# Patient Record
Sex: FEMALE | Race: WHITE | Employment: UNEMPLOYED | URBAN - NONMETROPOLITAN AREA
[De-identification: names, ages, dates, MRNs, and addresses within clinical notes are randomized per-mention and may not be internally consistent; named-entity substitution may affect disease eponyms.]

---

## 2018-01-11 ENCOUNTER — HOSPITAL ENCOUNTER (OUTPATIENT)
Age: 63
Setting detail: OBSERVATION
Discharge: HOME | End: 2018-01-13
Attending: INTERNAL MEDICINE | Admitting: INTERNAL MEDICINE
Payer: MEDICAID

## 2018-01-11 PROBLEM — R79.89 ELEVATED D-DIMER: Status: ACTIVE | Noted: 2018-01-11

## 2018-01-11 PROCEDURE — 2140000000 HC CCU INTERMEDIATE R&B

## 2018-01-11 PROCEDURE — G0378 HOSPITAL OBSERVATION PER HR: HCPCS

## 2018-01-11 PROCEDURE — G0379 DIRECT REFER HOSPITAL OBSERV: HCPCS

## 2018-01-11 PROCEDURE — 6360000002 HC RX W HCPCS: Performed by: INTERNAL MEDICINE

## 2018-01-11 PROCEDURE — 96374 THER/PROPH/DIAG INJ IV PUSH: CPT

## 2018-01-11 RX ORDER — PROMETHAZINE HYDROCHLORIDE 25 MG/1
25 TABLET ORAL EVERY 6 HOURS PRN
Status: DISCONTINUED | OUTPATIENT
Start: 2018-01-11 | End: 2018-01-13 | Stop reason: HOSPADM

## 2018-01-11 RX ORDER — NITROGLYCERIN 0.4 MG/1
0.4 TABLET SUBLINGUAL EVERY 5 MIN PRN
COMMUNITY

## 2018-01-11 RX ORDER — ATENOLOL 50 MG/1
50 TABLET ORAL 2 TIMES DAILY
Status: DISCONTINUED | OUTPATIENT
Start: 2018-01-12 | End: 2018-01-13 | Stop reason: HOSPADM

## 2018-01-11 RX ORDER — ALBUTEROL SULFATE 90 UG/1
2 AEROSOL, METERED RESPIRATORY (INHALATION) EVERY 6 HOURS PRN
Status: DISCONTINUED | OUTPATIENT
Start: 2018-01-11 | End: 2018-01-13 | Stop reason: HOSPADM

## 2018-01-11 RX ORDER — ALBUTEROL SULFATE 90 UG/1
2 AEROSOL, METERED RESPIRATORY (INHALATION) EVERY 6 HOURS PRN
COMMUNITY

## 2018-01-11 RX ORDER — MEPERIDINE HYDROCHLORIDE 25 MG/ML
50 INJECTION INTRAMUSCULAR; INTRAVENOUS; SUBCUTANEOUS
Status: DISCONTINUED | OUTPATIENT
Start: 2018-01-11 | End: 2018-01-12

## 2018-01-11 RX ORDER — BUDESONIDE AND FORMOTEROL FUMARATE DIHYDRATE 160; 4.5 UG/1; UG/1
2 AEROSOL RESPIRATORY (INHALATION) 2 TIMES DAILY
COMMUNITY

## 2018-01-11 RX ORDER — ASPIRIN 81 MG/1
81 TABLET, CHEWABLE ORAL DAILY
Status: DISCONTINUED | OUTPATIENT
Start: 2018-01-12 | End: 2018-01-13 | Stop reason: HOSPADM

## 2018-01-11 RX ORDER — PANTOPRAZOLE SODIUM 40 MG/1
40 TABLET, DELAYED RELEASE ORAL DAILY
Status: DISCONTINUED | OUTPATIENT
Start: 2018-01-12 | End: 2018-01-13

## 2018-01-11 RX ORDER — MEPERIDINE HYDROCHLORIDE 25 MG/ML
25 INJECTION INTRAMUSCULAR; INTRAVENOUS; SUBCUTANEOUS
Status: DISCONTINUED | OUTPATIENT
Start: 2018-01-11 | End: 2018-01-12

## 2018-01-11 RX ORDER — BUTALBITAL, ACETAMINOPHEN AND CAFFEINE 50; 325; 40 MG/1; MG/1; MG/1
1 TABLET ORAL EVERY 4 HOURS PRN
Status: DISCONTINUED | OUTPATIENT
Start: 2018-01-11 | End: 2018-01-13 | Stop reason: HOSPADM

## 2018-01-11 RX ORDER — NITROGLYCERIN 0.4 MG/1
0.4 TABLET SUBLINGUAL EVERY 5 MIN PRN
Status: DISCONTINUED | OUTPATIENT
Start: 2018-01-11 | End: 2018-01-13 | Stop reason: HOSPADM

## 2018-01-11 RX ORDER — MEPERIDINE HYDROCHLORIDE 25 MG/ML
25 INJECTION INTRAMUSCULAR; INTRAVENOUS; SUBCUTANEOUS
Status: DISCONTINUED | OUTPATIENT
Start: 2018-01-11 | End: 2018-01-11 | Stop reason: SDUPTHER

## 2018-01-11 RX ORDER — SODIUM CHLORIDE 9 MG/ML
INJECTION, SOLUTION INTRAVENOUS CONTINUOUS
Status: DISCONTINUED | OUTPATIENT
Start: 2018-01-11 | End: 2018-01-13

## 2018-01-11 RX ORDER — DULOXETIN HYDROCHLORIDE 60 MG/1
60 CAPSULE, DELAYED RELEASE ORAL 2 TIMES DAILY
Status: DISCONTINUED | OUTPATIENT
Start: 2018-01-12 | End: 2018-01-13 | Stop reason: HOSPADM

## 2018-01-11 RX ORDER — MEPERIDINE HYDROCHLORIDE 25 MG/ML
50 INJECTION INTRAMUSCULAR; INTRAVENOUS; SUBCUTANEOUS
Status: DISCONTINUED | OUTPATIENT
Start: 2018-01-11 | End: 2018-01-11 | Stop reason: SDUPTHER

## 2018-01-11 RX ADMIN — MEPERIDINE HYDROCHLORIDE 50 MG: 25 INJECTION INTRAMUSCULAR; INTRAVENOUS; SUBCUTANEOUS at 23:19

## 2018-01-11 ASSESSMENT — PAIN SCALES - GENERAL
PAINLEVEL_OUTOF10: 9
PAINLEVEL_OUTOF10: 0

## 2018-01-12 PROBLEM — R07.9 CHEST PAIN: Status: ACTIVE | Noted: 2018-01-12

## 2018-01-12 LAB
ANION GAP SERPL CALCULATED.3IONS-SCNC: 12 MMOL/L (ref 7–19)
BUN BLDV-MCNC: 19 MG/DL (ref 8–23)
C-REACTIVE PROTEIN: 0.35 MG/DL (ref 0–0.5)
CALCIUM SERPL-MCNC: 8.6 MG/DL (ref 8.8–10.2)
CHLORIDE BLD-SCNC: 106 MMOL/L (ref 98–111)
CHOLESTEROL, TOTAL: 186 MG/DL (ref 160–199)
CO2: 25 MMOL/L (ref 22–29)
CREAT SERPL-MCNC: 1.1 MG/DL (ref 0.5–0.9)
GFR NON-AFRICAN AMERICAN: 50
GLUCOSE BLD-MCNC: 90 MG/DL (ref 74–109)
HCT VFR BLD CALC: 37.4 % (ref 37–47)
HDLC SERPL-MCNC: 43 MG/DL (ref 65–121)
HEMOGLOBIN: 12.3 G/DL (ref 12–16)
LDL CHOLESTEROL CALCULATED: 113 MG/DL
LV EF: 60 %
LVEF MODALITY: NORMAL
MCH RBC QN AUTO: 30.1 PG (ref 27–31)
MCHC RBC AUTO-ENTMCNC: 32.9 G/DL (ref 33–37)
MCV RBC AUTO: 91.7 FL (ref 81–99)
PDW BLD-RTO: 12.8 % (ref 11.5–14.5)
PLATELET # BLD: 164 K/UL (ref 130–400)
PMV BLD AUTO: 10.4 FL (ref 9.4–12.3)
POTASSIUM SERPL-SCNC: 3.8 MMOL/L (ref 3.5–5)
RBC # BLD: 4.08 M/UL (ref 4.2–5.4)
SEDIMENTATION RATE, ERYTHROCYTE: 21 MM/HR (ref 0–25)
SODIUM BLD-SCNC: 143 MMOL/L (ref 136–145)
TRIGL SERPL-MCNC: 152 MG/DL (ref 0–149)
TROPONIN: <0.01 NG/ML (ref 0–0.03)
WBC # BLD: 5.3 K/UL (ref 4.8–10.8)

## 2018-01-12 PROCEDURE — 6360000004 HC RX CONTRAST MEDICATION: Performed by: INTERNAL MEDICINE

## 2018-01-12 PROCEDURE — 96376 TX/PRO/DX INJ SAME DRUG ADON: CPT

## 2018-01-12 PROCEDURE — 6360000002 HC RX W HCPCS: Performed by: INTERNAL MEDICINE

## 2018-01-12 PROCEDURE — 96375 TX/PRO/DX INJ NEW DRUG ADDON: CPT

## 2018-01-12 PROCEDURE — 99220 PR INITIAL OBSERVATION CARE/DAY 70 MINUTES: CPT | Performed by: INTERNAL MEDICINE

## 2018-01-12 PROCEDURE — G0378 HOSPITAL OBSERVATION PER HR: HCPCS

## 2018-01-12 PROCEDURE — 36415 COLL VENOUS BLD VENIPUNCTURE: CPT

## 2018-01-12 PROCEDURE — 80061 LIPID PANEL: CPT

## 2018-01-12 PROCEDURE — 93306 TTE W/DOPPLER COMPLETE: CPT

## 2018-01-12 PROCEDURE — 85027 COMPLETE CBC AUTOMATED: CPT

## 2018-01-12 PROCEDURE — 6370000000 HC RX 637 (ALT 250 FOR IP): Performed by: INTERNAL MEDICINE

## 2018-01-12 PROCEDURE — 2580000003 HC RX 258: Performed by: INTERNAL MEDICINE

## 2018-01-12 PROCEDURE — C8928 TTE W OR W/O FOL W/CON,STRES: HCPCS

## 2018-01-12 PROCEDURE — 84484 ASSAY OF TROPONIN QUANT: CPT

## 2018-01-12 PROCEDURE — 6360000002 HC RX W HCPCS

## 2018-01-12 PROCEDURE — 6360000002 HC RX W HCPCS: Performed by: NURSE PRACTITIONER

## 2018-01-12 PROCEDURE — 85652 RBC SED RATE AUTOMATED: CPT

## 2018-01-12 PROCEDURE — 80048 BASIC METABOLIC PNL TOTAL CA: CPT

## 2018-01-12 PROCEDURE — 86140 C-REACTIVE PROTEIN: CPT

## 2018-01-12 RX ORDER — MEPERIDINE HYDROCHLORIDE 50 MG/ML
50 INJECTION INTRAMUSCULAR; INTRAVENOUS; SUBCUTANEOUS
Status: DISCONTINUED | OUTPATIENT
Start: 2018-01-12 | End: 2018-01-13

## 2018-01-12 RX ORDER — DOBUTAMINE HYDROCHLORIDE 200 MG/100ML
10 INJECTION INTRAVENOUS CONTINUOUS PRN
Status: DISCONTINUED | OUTPATIENT
Start: 2018-01-12 | End: 2018-01-13

## 2018-01-12 RX ORDER — SODIUM CHLORIDE 9 MG/ML
INJECTION, SOLUTION INTRAVENOUS
Status: ACTIVE | OUTPATIENT
Start: 2018-01-12 | End: 2018-01-12

## 2018-01-12 RX ORDER — ONDANSETRON 2 MG/ML
INJECTION INTRAMUSCULAR; INTRAVENOUS
Status: COMPLETED
Start: 2018-01-12 | End: 2018-01-12

## 2018-01-12 RX ORDER — HYDROCODONE BITARTRATE AND ACETAMINOPHEN 5; 325 MG/1; MG/1
2 TABLET ORAL EVERY 6 HOURS PRN
Status: DISCONTINUED | OUTPATIENT
Start: 2018-01-12 | End: 2018-01-13

## 2018-01-12 RX ORDER — MEPERIDINE HYDROCHLORIDE 50 MG/ML
25 INJECTION INTRAMUSCULAR; INTRAVENOUS; SUBCUTANEOUS
Status: DISCONTINUED | OUTPATIENT
Start: 2018-01-12 | End: 2018-01-13

## 2018-01-12 RX ORDER — ONDANSETRON 2 MG/ML
4 INJECTION INTRAMUSCULAR; INTRAVENOUS EVERY 6 HOURS PRN
Status: DISCONTINUED | OUTPATIENT
Start: 2018-01-12 | End: 2018-01-13 | Stop reason: HOSPADM

## 2018-01-12 RX ORDER — ATROPINE SULFATE 0.1 MG/ML
1 INJECTION INTRAVENOUS PRN
Status: DISCONTINUED | OUTPATIENT
Start: 2018-01-12 | End: 2018-01-13

## 2018-01-12 RX ORDER — SODIUM CHLORIDE 9 MG/ML
INJECTION, SOLUTION INTRAVENOUS
Status: COMPLETED | OUTPATIENT
Start: 2018-01-12 | End: 2018-01-12

## 2018-01-12 RX ADMIN — PANTOPRAZOLE SODIUM 40 MG: 40 TABLET, DELAYED RELEASE ORAL at 07:54

## 2018-01-12 RX ADMIN — MEPERIDINE HYDROCHLORIDE 50 MG: 25 INJECTION INTRAMUSCULAR; INTRAVENOUS; SUBCUTANEOUS at 07:55

## 2018-01-12 RX ADMIN — ASPIRIN 81 MG CHEWABLE TABLET 81 MG: 81 TABLET CHEWABLE at 07:54

## 2018-01-12 RX ADMIN — DULOXETINE HYDROCHLORIDE 60 MG: 60 CAPSULE, DELAYED RELEASE ORAL at 07:53

## 2018-01-12 RX ADMIN — ATENOLOL 50 MG: 50 TABLET ORAL at 07:54

## 2018-01-12 RX ADMIN — MEPERIDINE HYDROCHLORIDE 50 MG: 50 INJECTION INTRAMUSCULAR; INTRAVENOUS; SUBCUTANEOUS at 12:36

## 2018-01-12 RX ADMIN — ATROPINE SULFATE 0.03 MG: 0.1 INJECTION PARENTERAL at 11:32

## 2018-01-12 RX ADMIN — MOMETASONE FUROATE AND FORMOTEROL FUMARATE DIHYDRATE 2 PUFF: 200; 5 AEROSOL RESPIRATORY (INHALATION) at 07:55

## 2018-01-12 RX ADMIN — SODIUM CHLORIDE: 9 INJECTION, SOLUTION INTRAVENOUS at 11:20

## 2018-01-12 RX ADMIN — ONDANSETRON 4 MG: 2 INJECTION INTRAMUSCULAR; INTRAVENOUS at 12:36

## 2018-01-12 RX ADMIN — HYDROCORTISONE SODIUM SUCCINATE 200 MG: 100 INJECTION, POWDER, FOR SOLUTION INTRAMUSCULAR; INTRAVENOUS at 12:45

## 2018-01-12 RX ADMIN — DULOXETINE HYDROCHLORIDE 60 MG: 60 CAPSULE, DELAYED RELEASE ORAL at 20:38

## 2018-01-12 RX ADMIN — DOBUTAMINE HYDROCHLORIDE 10 MCG/KG/MIN: 200 INJECTION INTRAVENOUS at 11:20

## 2018-01-12 RX ADMIN — ATROPINE SULFATE 0.25 MG: 0.1 INJECTION PARENTERAL at 11:38

## 2018-01-12 RX ADMIN — SODIUM CHLORIDE: 9 INJECTION, SOLUTION INTRAVENOUS at 03:09

## 2018-01-12 RX ADMIN — ONDANSETRON 4 MG: 2 INJECTION INTRAMUSCULAR; INTRAVENOUS at 03:42

## 2018-01-12 RX ADMIN — ATROPINE SULFATE 0.25 MG: 0.1 INJECTION PARENTERAL at 11:35

## 2018-01-12 RX ADMIN — PROMETHAZINE HYDROCHLORIDE 25 MG: 25 TABLET ORAL at 03:08

## 2018-01-12 RX ADMIN — MEPERIDINE HYDROCHLORIDE 50 MG: 50 INJECTION INTRAMUSCULAR; INTRAVENOUS; SUBCUTANEOUS at 21:20

## 2018-01-12 RX ADMIN — ATENOLOL 50 MG: 50 TABLET ORAL at 20:38

## 2018-01-12 RX ADMIN — ATROPINE SULFATE 0.25 MG: 0.1 INJECTION PARENTERAL at 11:30

## 2018-01-12 RX ADMIN — PERFLUTREN 1 ML: 6.52 INJECTION, SUSPENSION INTRAVENOUS at 11:00

## 2018-01-12 RX ADMIN — MEPERIDINE HYDROCHLORIDE 50 MG: 25 INJECTION INTRAMUSCULAR; INTRAVENOUS; SUBCUTANEOUS at 02:21

## 2018-01-12 RX ADMIN — HYDROCODONE BITARTRATE AND ACETAMINOPHEN 2 TABLET: 5; 325 TABLET ORAL at 20:38

## 2018-01-12 RX ADMIN — MEPERIDINE HYDROCHLORIDE 25 MG: 50 INJECTION INTRAMUSCULAR; INTRAVENOUS; SUBCUTANEOUS at 15:41

## 2018-01-12 ASSESSMENT — PAIN SCALES - GENERAL
PAINLEVEL_OUTOF10: 0
PAINLEVEL_OUTOF10: 10
PAINLEVEL_OUTOF10: 8
PAINLEVEL_OUTOF10: 9
PAINLEVEL_OUTOF10: 10
PAINLEVEL_OUTOF10: 8
PAINLEVEL_OUTOF10: 9

## 2018-01-12 NOTE — H&P
[facility]      History and Physical      Date of Admission:  1/11/2018 10:08 PM    Date of Initially Being Seen / Consultation:  1/12/18    Cardiologist:  Dr Maral Drake    Cardiology Attending: Dr Maral Drake       PCP:  Sara Fletcher MD    Reason for Consultation or Admission / Chief Complaint:  Chest Discomfort     SUBJECTIVE AND HISTORY OF PRESENT ILLNESS:    Source of the history:  Patient, family, previous inpatient and outpatient records in [facility] Jaron Gill is a 60 y.o. female who presents to [facility] PCU as direct admit from [facility] with symptoms / signs / problem or diagnosis of chest discomfort. Patient states she was sitting in bed yesterday morning reading book to her grandson and experienced sharp midsternal chest pain. Pain is reproducible with cough and palpation. Associated with diaphoresis, nausea and shortness of breath. Patient called her  to take her to [facility] ER. Patient received 1SL NTG and Dilaudid for pain which did relief discomfort. Patient was transferred to [facility] PCU as direct admit. Family present:  no      CARDIAC RISK PROFILE:    Risk Factor Yes / No / Unknown       Gender Female   Cigarette Use No   Family History of Cardiovascular Disease Yes: Mother   Diabetes Mellitus no   Hypercholesteremia yes   Hypertension yes          Cardiac Specific Problems:    Specialty Problems        Cardiology Problems    HTN (hypertension)                PRIOR CARDIAC PROBLEM LIST  (IF APPLICABLE):    Cardiac Cath - Normal 2014 7/17/2012 Negative [address]O [address] cath - stent 7-8 yrs ago? She thinks not sure.      Past Medical History:    Past Medical History:   Diagnosis Date    Anxiety     Cerebral artery occlusion with cerebral infarction ([address]     [facility]     Depression     Hypertension     Mini stroke ([facility] 2003         Past Surgical History:    Past Surgical

## 2018-01-13 ENCOUNTER — APPOINTMENT (OUTPATIENT)
Dept: CT IMAGING | Age: 63
End: 2018-01-13
Attending: INTERNAL MEDICINE
Payer: MEDICAID

## 2018-01-13 VITALS
TEMPERATURE: 97.2 F | DIASTOLIC BLOOD PRESSURE: 63 MMHG | HEART RATE: 60 BPM | HEIGHT: 65 IN | OXYGEN SATURATION: 93 % | BODY MASS INDEX: 32.8 KG/M2 | SYSTOLIC BLOOD PRESSURE: 110 MMHG | WEIGHT: 196.9 LBS | RESPIRATION RATE: 18 BRPM

## 2018-01-13 LAB — TROPONIN: <0.01 NG/ML (ref 0–0.03)

## 2018-01-13 PROCEDURE — 36415 COLL VENOUS BLD VENIPUNCTURE: CPT

## 2018-01-13 PROCEDURE — 84484 ASSAY OF TROPONIN QUANT: CPT

## 2018-01-13 PROCEDURE — 6370000000 HC RX 637 (ALT 250 FOR IP): Performed by: HOSPITALIST

## 2018-01-13 PROCEDURE — 71275 CT ANGIOGRAPHY CHEST: CPT

## 2018-01-13 PROCEDURE — 6370000000 HC RX 637 (ALT 250 FOR IP): Performed by: INTERNAL MEDICINE

## 2018-01-13 PROCEDURE — 6360000002 HC RX W HCPCS: Performed by: INTERNAL MEDICINE

## 2018-01-13 PROCEDURE — 6360000002 HC RX W HCPCS: Performed by: HOSPITALIST

## 2018-01-13 PROCEDURE — G0378 HOSPITAL OBSERVATION PER HR: HCPCS

## 2018-01-13 PROCEDURE — 99225 PR SBSQ OBSERVATION CARE/DAY 25 MINUTES: CPT | Performed by: INTERNAL MEDICINE

## 2018-01-13 PROCEDURE — 6360000004 HC RX CONTRAST MEDICATION: Performed by: INTERNAL MEDICINE

## 2018-01-13 PROCEDURE — 99220 PR INITIAL OBSERVATION CARE/DAY 70 MINUTES: CPT | Performed by: HOSPITALIST

## 2018-01-13 RX ORDER — METHYLPREDNISOLONE SODIUM SUCCINATE 125 MG/2ML
80 INJECTION, POWDER, LYOPHILIZED, FOR SOLUTION INTRAMUSCULAR; INTRAVENOUS ONCE
Status: COMPLETED | OUTPATIENT
Start: 2018-01-13 | End: 2018-01-13

## 2018-01-13 RX ORDER — SUCRALFATE ORAL 1 G/10ML
1 SUSPENSION ORAL
Status: DISCONTINUED | OUTPATIENT
Start: 2018-01-13 | End: 2018-01-13 | Stop reason: HOSPADM

## 2018-01-13 RX ORDER — PANTOPRAZOLE SODIUM 40 MG/1
40 TABLET, DELAYED RELEASE ORAL
Qty: 30 TABLET | Refills: 3 | Status: SHIPPED | OUTPATIENT
Start: 2018-01-14

## 2018-01-13 RX ORDER — PANTOPRAZOLE SODIUM 40 MG/1
40 TABLET, DELAYED RELEASE ORAL
Status: DISCONTINUED | OUTPATIENT
Start: 2018-01-13 | End: 2018-01-13 | Stop reason: HOSPADM

## 2018-01-13 RX ORDER — SUCRALFATE ORAL 1 G/10ML
1 SUSPENSION ORAL
Qty: 1200 ML | Refills: 3 | Status: SHIPPED | OUTPATIENT
Start: 2018-01-13

## 2018-01-13 RX ADMIN — IOPAMIDOL 90 ML: 755 INJECTION, SOLUTION INTRAVENOUS at 12:46

## 2018-01-13 RX ADMIN — PANTOPRAZOLE SODIUM 40 MG: 40 TABLET, DELAYED RELEASE ORAL at 17:04

## 2018-01-13 RX ADMIN — ATENOLOL 50 MG: 50 TABLET ORAL at 07:32

## 2018-01-13 RX ADMIN — PROMETHAZINE HYDROCHLORIDE 25 MG: 25 TABLET ORAL at 07:35

## 2018-01-13 RX ADMIN — PANTOPRAZOLE SODIUM 40 MG: 40 TABLET, DELAYED RELEASE ORAL at 07:35

## 2018-01-13 RX ADMIN — ONDANSETRON 4 MG: 2 INJECTION INTRAMUSCULAR; INTRAVENOUS at 07:59

## 2018-01-13 RX ADMIN — ASPIRIN 81 MG CHEWABLE TABLET 81 MG: 81 TABLET CHEWABLE at 07:31

## 2018-01-13 RX ADMIN — MOMETASONE FUROATE AND FORMOTEROL FUMARATE DIHYDRATE 2 PUFF: 200; 5 AEROSOL RESPIRATORY (INHALATION) at 09:37

## 2018-01-13 RX ADMIN — SUCRALFATE 1 G: 1 SUSPENSION ORAL at 17:04

## 2018-01-13 RX ADMIN — METHYLPREDNISOLONE SODIUM SUCCINATE 80 MG: 125 INJECTION, POWDER, FOR SOLUTION INTRAMUSCULAR; INTRAVENOUS at 17:04

## 2018-01-13 RX ADMIN — HYDROCODONE BITARTRATE AND ACETAMINOPHEN 2 TABLET: 5; 325 TABLET ORAL at 13:36

## 2018-01-13 RX ADMIN — MEPERIDINE HYDROCHLORIDE 50 MG: 50 INJECTION INTRAMUSCULAR; INTRAVENOUS; SUBCUTANEOUS at 08:10

## 2018-01-13 RX ADMIN — HYDROCODONE BITARTRATE AND ACETAMINOPHEN 2 TABLET: 5; 325 TABLET ORAL at 07:34

## 2018-01-13 RX ADMIN — MEPERIDINE HYDROCHLORIDE 50 MG: 50 INJECTION INTRAMUSCULAR; INTRAVENOUS; SUBCUTANEOUS at 00:36

## 2018-01-13 ASSESSMENT — PAIN SCALES - GENERAL
PAINLEVEL_OUTOF10: 7
PAINLEVEL_OUTOF10: 7
PAINLEVEL_OUTOF10: 10
PAINLEVEL_OUTOF10: 6
PAINLEVEL_OUTOF10: 10

## 2018-01-13 NOTE — PROGRESS NOTES
Was informed by pt that she wanted to speak to the charge nurse because she did not feel that her pain was being treated appropriately. Pt had a previous order for IV pain medication however the pt iv access and been d/c'd due the pt being discharged. Per charge nurse IV access restarted to restart IV pain medication administration.  Will continue to monitor pt's pain level

## 2018-01-13 NOTE — PROGRESS NOTES
Pt has been stating that she was in severe pain in her chest and back. Vital signs WNL and Telemetry showed NSR. Order received for PO Norco 5 Q6 hours.  Administered medication and will continue to monitor

## 2018-01-13 NOTE — CONSULTS
[facility]    CONSULT    Patient:  Marvin Arce  YOB: 1955  Date of Service: 1/13/2018  MRN: [id number]   Acct: [de-identified]   Primary Care Physician: Siobhan Leslie MD    Reason for consult: Evaluation of Non-Cardiac Chest Pain    Requesting Physician: Dr. Lovely Miller    History Obtained From: Patient    HISTORY OF PRESENT ILLNESS:  Ms. Rafael Leiva is a 60 y.o. female who was admitted as a direct admit from [facility] with chest discomfort. She described the discomfort as sharp, located in center of her chest that occurred at rest. The pain was reproducible with cough and palpation. She also complained of sweating and shortness of breath. Cardiac enzymes were negative. Her d-dimer was elevated. She was transferred to [facility] for higher level of care. Darene Ada was completed, negative for coronary ischemia. A CTA was done due to reported elevated d-dimer which was negative for PE. There was noted hepatosplenomegaly, mediastinal adenopathy and upper abdominal adenopathy. Hospitalist were consulted for evaluation of non-cardiac chest pain. Past Medical History:       Diagnosis Date    Anxiety     Cerebral artery occlusion with cerebral infarction ([address]     COPD (chronic obstructive pulmonary disease) ([address]     Depression     Hypertension     Mini stroke ([address] 2003       Past Surgical History:        Procedure Laterality Date    APPENDECTOMY      BACK SURGERY      9    CARDIAC CATHETERIZATION  9/4/14  [address]    EF 60%. normal LV funtion.  CARDIAC SURGERY      growth removal    CHOLECYSTECTOMY      COLONOSCOPY      EYE SURGERY      HYSTERECTOMY      THYROIDECTOMY         Allergies:  Belladonna alkaloids;  Morphine; Ibuprofen; Tape [adhesive tape]; and Thorazine [chlorpromazine hcl]    Medications Current:   Current Facility-Administered Medications   Medication Dose Route Frequency Provider Last Rate Last Dose    pantoprazole (PROTONIX) tablet 40 mg  40 mg Oral BID AC Tamar Velasquez MD   40 mg at 01/13/18 1704    sucralfate (CARAFATE) 1 GM/10ML suspension 1 g  1 g Oral 4x Daily AC & HS Tamar Velasquez MD   1 g at 01/13/18 1704    ondansetron (ZOFRAN) injection 4 mg  4 mg Intravenous Q6H PRN CARLOS Carlton MD   4 mg at 01/13/18 0759    albuterol sulfate  (90 Base) MCG/ACT inhaler 2 puff  2 puff Inhalation Q6H PRN CARLOS Carlton MD        aspirin chewable tablet 81 mg  81 mg Oral Daily CARLOS Carlton MD   81 mg at 01/13/18 0731    atenolol (TENORMIN) tablet 50 mg  50 mg Oral BID CARLOS Carlton MD   50 mg at 01/13/18 0732    mometasone-formoterol (DULERA) 200-5 MCG/ACT inhaler 2 puff  2 puff Inhalation BID CARLOS Carlton MD   2 puff at 01/13/18 0937    butalbital-acetaminophen-caffeine (FIORICET, ESGIC) per tablet 1 tablet  1 tablet Oral Q4H PRN CARLOS Carlton MD        DULoxetine (CYMBALTA) extended release capsule 60 mg  60 mg Oral BID CARLOS Carlton MD   60 mg at 01/12/18 2038    nitroGLYCERIN (NITROSTAT) SL tablet 0.4 mg  0.4 mg Sublingual Q5 Min PRN CARLOS Carlton MD        [facility]) tablet 25 mg  25 mg Oral Q6H PRN CARLOS Carlton MD   25 mg at 01/13/18 9862       Social History:  Reports that she has never smoked. She has never used smokeless tobacco. She reports that she does not drink alcohol or use drugs. Family History:      Problem Relation Age of Onset    Cancer Father        REVIEW OF SYSTEMS:  General: Denies any fever or chills. Denies any unexplained weight loss or gain. Denies any change in activity or endurance. HEENT: Denies any headaches or visual changes. Respiratory: Denies any cough or hoarseness. Cardiac: Chest pain at rest, worse with movement and nausea/vomiting. Denies any palpitations. Denies any presyncope or syncope. Denies any orthopnea or PND. Denies any lower extremity edema. GI: Diffuse abdominal pain with nausea and vomiting.   : Denies any hematuria, frequency, hesitancy, or dysuria. Musculoskeletal: Bilateral hip pain. Complained of gout in right foot. Neurological: Denies any paraesthesias. Denies any history of seizure or stroke symptoms. Psychological: Denies any problems with anxiety or depression. All other systems are negative except where stated above. PHYSICAL EXAM:  /69   Pulse 60   Temp 98.6 °F (37 °C) (Temporal)   Resp 16   Ht 5' 5\" (1.651 m)   Wt 196 lb 14.4 oz (89.3 kg)   SpO2 96%   BMI 32.77 kg/m²   General appearance: Demonstrates a well-developed, well-nourished  female who appears older than her stated age. She was sleeping and had to awakened for exam. She is alert and oriented in no acute distress, but complains of diffuse chest pain and abdominal pain. NECK: Supple. NO JVD. No carotids bruits auscultated. Chest: Clear to auscultation bilaterally without wheezes or rhonchi. Cardiac: Normal heart tones with regular rate and rhythm, S1, S2 normal. No murmurs, gallops, or rubs auscultated. Abdomen: Obese, soft, non-tender; non-distended normal bowel sounds no masses, no organomegaly. Extremities: No clubbing or cyanosis. No peripheral edema. Peripheral pulses palpable. Skin: Skin color, texture, turgor normal. No rashes or lesions  Neurologic: Grossly intact. LABS AND DIAGNOSTICS:    CTA Pulmonary with Contrast:  1. No evidence of pulmonary embolus. 2. Mediastinal adenopathy and upper abdominal adenopathy. An underlying lymphoproliferative disorder is possible. 3. Mild pulmonary edema.     CBC with Differential:   Lab Results   Component Value Date    WBC 5.3 01/12/2018    RBC 4.08 01/12/2018    HGB 12.3 01/12/2018    HCT 37.4 01/12/2018     01/12/2018    MCV 91.7 01/12/2018     BMP:   Lab Results   Component Value Date     01/12/2018    K 3.8 01/12/2018     01/12/2018    CO2 25 01/12/2018    BUN 19 01/12/2018    CREATININE 1.1 01/12/2018    CALCIUM 8.6 01/12/2018    LABGLOM 50 01/12/2018    GLUCOSE 90 01/12/2018     2D Echocardiogram:  Normal left ventricular size and function. Moderate concentric left ventricular hypertrophy. E/A flow reversal noted. Suggestive of diastolic dysfunction. ASSESSMENT:    1. Atypical Chest Pain with Negative Lexiscan  2. GERD  3. Essential HTN  4. Elevated d-dimer with Negative CTA  5. Hepatosplenomegaly on CTA with Medistinal and Upper Abdomen Adenopathy  6. Anxiety    PLAN:    1. D/C IV Narcotics and Oral Narcotics, Resume Home Pain Medication  2. Increase PPI to BID and Add Carafate Liquid  3. Recommend Outpatient Work-Up for Non-Cardiac Chest Pain - EGD if not resolved with 4 weeks PO therapy  4. Recommend Follow-Up Abdominal CT in 6 months regarding Hepatosplenomegaly and Adenopathy  5. Steroid pulse for costochondritis and rotator cuff tendonitis  6. [facility] for Discharge for [id number] [address] HAMZAH Johnson    I have independently interviewed and examined the patient. I have discussed key elements of the care plan with the PA or APRN & agree with the findings and care plan as documented above.   Norah Zarco MD    CC: Chest pain, shoulder pain, abdominal pain, epigastric pain  S: Chest pain in the lower medial bilateral ribcages, epigastrium and both shoulders, L > R with painful, decreased ROM  O:Vitals: /69   Pulse 60   Temp 98.6 °F (37 °C) (Temporal)   Resp 16   Ht 5' 5\" (1.651 m)   Wt 196 lb 14.4 oz (89.3 kg)   SpO2 96%   BMI 32.77 kg/m²   24HR INTAKE/OUTPUT:      Intake/Output Summary (Last 24 hours) at 01/13/18 1728  Last data filed at 01/13/18 1403   Gross per 24 hour   Intake             1560 ml   Output              200 ml   Net             1360 ml     General appearance: alert and cooperative with exam  HEENT: atraumatic, eyes with clear conjunctiva and normal lids, pupils and irises normal, external ears and nose are normal, lips normal. Neck without masses, lympadenopathy, bruit, thyroid normal.   Lungs:

## 2018-01-13 NOTE — PROGRESS NOTES
Patient continues to complain of left sided chest discomfort and upper back pain. Both physicians are aware of patient condition. Nurse makes patient aware of discharge order. Patient states that she does have a ride home, however her pain continues to be severe. She states that \" I will just go to another ER, I can not stand this pain all night\". Patient is educated on cardiologist not to prescribe a narcotic. Nurse makes  aware again of patient condition.

## 2018-01-13 NOTE — PROGRESS NOTES
Patient continues to complain of upper back pain and left sided chest pain.  aware of patient continued pain, PRN Norco given and patient is very agitated about taking PO pain medications, states that these do not help her. Will make MD aware.

## 2018-01-13 NOTE — PROGRESS NOTES
Medications given during system downtime for computer on wheels. Nurse verified medications per [facility] report.

## 2018-01-13 NOTE — PROGRESS NOTES
pts  wanting to speak with the charge nurse. Went to pts room to speak with family and pt. Pt reporting that she has continuing unrelieved pain. Explained that the cardioligist has been notified and orders have been given to treat her pain. Explained that the dr has ruled out her heart and that they do not treat pain outside of acutely related chest pain. Pt states that the meds that were ordered were \"nothing more than asprin for this pain, eloy had two heart surgeries in the past and I know what it feels like. \" sympathized with pt that I understood that she was having pain, but that the staff can not do anything outside of what the drs orders. Pt very upset with situation. Trying to explain to pt that I do understand, but pt is very upset an inconsolable at this time. Will contact clinical house to see again to see if arrangements can be made to get patient home.  .  Electronically signed by Volanda Claude, RN on 1/12/2018 at 9:13 PM

## 2018-01-13 NOTE — PROGRESS NOTES
[facility]  Progress Note                            Date:  1/13/2018  Patient: Jaron Gill  Admission:  1/11/2018 10:08 PM  Admit DX: Elevated d-dimer [R79.89]  Chest pain [R07.9]  Chest pressure [R07.89]  Age:  60 y. o., 1955     LOS: 1 day     Reason for evaluation:   Chest wall pain      SUBJECTIVE:    The patient was seen and examined. Notes and labs reviewed. There were no complications over night. Patient's cardiac review of systems: positive for chest pain. The patient is generally feeling in pain. Constant chest discomfort, asking for narcotic pain medications and taking IV Demerol whenever it's due. OBJECTIVE:    Telemetry: sinus rhythm  /80   Pulse 50   Temp 97.6 °F (36.4 °C) (Temporal)   Resp 16   Ht 5' 5\" (1.651 m)   Wt 196 lb 14.4 oz (89.3 kg)   SpO2 97%   BMI 32.77 kg/m²     Intake/Output Summary (Last 24 hours) at 01/13/18 1130  Last data filed at 01/13/18 0525   Gross per 24 hour   Intake             1740 ml   Output                0 ml   Net             1740 ml       Labs:   CBC:   Recent Labs      01/12/18   0431   WBC  5.3   HGB  12.3   HCT  37.4   PLT  164     BMP: Recent Labs      01/12/18   0431   NA  143   K  3.8   CO2  25   BUN  19   CREATININE  1.1*   LABGLOM  50*   GLUCOSE  90     BNP: No results for input(s): BNP in the last 72 hours. PT/INR: No results for input(s): PROTIME, INR in the last 72 hours. APTT:No results for input(s): APTT in the last 72 hours. CARDIAC ENZYMES:  Recent Labs      01/11/18   2326  01/12/18   0431  01/12/18   1233   TROPONINI  <0.01  <0.01  <0.01     FASTING LIPID PANEL:  Lab Results   Component Value Date    HDL 43 01/12/2018    LDLDIRECT 129 09/04/2014    LDLCALC 113 01/12/2018    TRIG 152 01/12/2018     LIVER PROFILE:No results for input(s): AST, ALT, LABALBU in the last 72 hours.         PFSH:  No change    ROSS:  No change      Physical Examination:  /80   Pulse 50   Temp 97.6 °F (36.4 °C) (Temporal)   Resp 16   Ht 5' 5\" (1.651 m)   Wt 196 lb 14.4 oz (89.3 kg)   SpO2 97%   BMI 32.77 kg/m²     CONSTITUTIONAL:  Awake, alert, cooperative, no apparent distress, and appears stated age. HEENT: Normal jugular venous pulsations, no carotid bruits. Head is atraumatic, normocephalic. Eyes and oral mucosa are normal.  LUNGS: Respiratory effort for the work of breathing is normal.  On auscultation: lungs clear  CARDIOVASCULAR:  Normal apical impulse, regular rate and rhythm, normal S1 and S2, no S3 or S4, and no murmur or rub is noted. ABDOMEN: Soft, nontender, nondistended. Bowel sounds are present. No masses or tenderness. SKIN: Warm and dry. EXTREMITIES: no lower extremity edema. No cyanosis or clubbing. NEUROLOGY:  Motor movement grossly intact. Focal signs are not identified. Current Inpatient Medications:   aspirin  81 mg Oral Daily    atenolol  50 mg Oral BID    mometasone-formoterol  2 puff Inhalation BID    DULoxetine  60 mg Oral BID    pantoprazole  40 mg Oral Daily       IV Infusions (if any):   sodium chloride 75 mL/hr at 01/12/18 0309       Diagnostics:    EKG: normal sinus rhythm, unchanged from previous tracings. ECHO: reviewed. No wall motion abnormalities and no pericardial effusion. .   Stress Test: reviewed. dobutamine stress echocardiogram was negative. Target heart rate was not quite attain due to beta-blockade. Cardiac Angiography: deferred due to negative enzymes, ischemic study and the extremely atypical nature of the patient's symptoms. ASSESSMENT:    Patient Active Problem List    Diagnosis Date Noted    Chest pain 01/12/2018     Priority: Low    Elevated d-dimer 01/11/2018     Priority: Low    Chest pressure 10/30/2016     Priority: Low     History of an angioplasty in the past details?   7/17/2012  lexiscan negative for myocardial ischemia, EF 74  %  9/4/2014  Cath NCA, mild CAD  9/5/2014  Echo normal LVFX, RVSP 30 mmHg  10/30/16  lexiscan negative for

## 2018-01-16 NOTE — DISCHARGE SUMMARY
myocardial ischemia. The patient was given a dose of steroids intravenously but failed to improve significantly. She continued to ask for narcotics to relieve her discomfort. Finally, a CTA was done to rule out pulmonary embolus and it was negative for pulmonary embolus but showed the above-mentioned lymphadenopathy. Therefore I asked the hospitalist to see her. They have provided her with steroids for costochondritis, PPI for her reflux and recommended physical therapy for bilateral rotator cuff tendinitis. It was also recommended that she obtain a repeat scan for her lymphadenopathy in 6 months time. I Plan to make the patient's primary care provider aware of these findings. DISCHARGE MEDICATIONS:    See medication reconciliation. Electronically signed by CARLOS Goncalves MD on 1/16/18

## 2018-01-22 ENCOUNTER — TELEPHONE (OUTPATIENT)
Dept: CARDIOLOGY | Age: 63
End: 2018-01-22

## 2018-01-22 NOTE — TELEPHONE ENCOUNTER
Attempted to reach pt. To confirm who her primary care provider is so that I could make sure that she gets the CTA results that she had in the hospital for followup. Home phone is not a working number and mobile phone says she is not available and unable to leave message.

## 2018-01-24 ENCOUNTER — TELEPHONE (OUTPATIENT)
Dept: CARDIOLOGY | Age: 63
End: 2018-01-24

## 2018-01-30 ENCOUNTER — TELEPHONE (OUTPATIENT)
Dept: CARDIOLOGY | Age: 63
End: 2018-01-30

## 2023-05-18 ENCOUNTER — HOSPITAL ENCOUNTER (INPATIENT)
Facility: HOSPITAL | Age: 68
LOS: 3 days | Discharge: HOME | End: 2023-05-21
Attending: INTERNAL MEDICINE | Admitting: FAMILY MEDICINE
Payer: MEDICARE

## 2023-05-18 ENCOUNTER — APPOINTMENT (OUTPATIENT)
Dept: GENERAL RADIOLOGY | Facility: HOSPITAL | Age: 68
End: 2023-05-18
Payer: MEDICARE

## 2023-05-18 PROBLEM — T50.901A OVERDOSE: Status: ACTIVE | Noted: 2023-05-18

## 2023-05-18 PROBLEM — T50.902A INTENTIONAL OVERDOSE: Status: ACTIVE | Noted: 2023-05-18

## 2023-05-18 LAB
ALBUMIN SERPL-MCNC: 3.7 G/DL (ref 3.5–5.2)
ALP SERPL-CCNC: 82 U/L (ref 39–117)
ALT SERPL W P-5'-P-CCNC: 20 U/L (ref 1–33)
ANION GAP SERPL CALCULATED.3IONS-SCNC: 13 MMOL/L (ref 5–15)
ARTERIAL PATENCY WRIST A: POSITIVE
AST SERPL-CCNC: 26 U/L (ref 1–32)
ATMOSPHERIC PRESS: 748 MMHG
BASE EXCESS BLDA CALC-SCNC: -1.4 MMOL/L (ref 0–2)
BASOPHILS # BLD AUTO: 0.04 10*3/MM3 (ref 0–0.2)
BASOPHILS NFR BLD AUTO: 0.7 % (ref 0–1.5)
BDY SITE: ABNORMAL
BILIRUB CONJ SERPL-MCNC: <0.2 MG/DL (ref 0–0.3)
BILIRUB INDIRECT SERPL-MCNC: NORMAL MG/DL
BILIRUB SERPL-MCNC: 0.5 MG/DL (ref 0–1.2)
BODY TEMPERATURE: 37 C
BUN SERPL-MCNC: 30 MG/DL (ref 8–23)
BUN/CREAT SERPL: 30 (ref 7–25)
CALCIUM SPEC-SCNC: 8.8 MG/DL (ref 8.6–10.5)
CHLORIDE SERPL-SCNC: 106 MMOL/L (ref 98–107)
CO2 SERPL-SCNC: 21 MMOL/L (ref 22–29)
CREAT SERPL-MCNC: 1 MG/DL (ref 0.57–1)
DEPRECATED RDW RBC AUTO: 42.2 FL (ref 37–54)
EGFRCR SERPLBLD CKD-EPI 2021: 61.5 ML/MIN/1.73
EOSINOPHIL # BLD AUTO: 0.05 10*3/MM3 (ref 0–0.4)
EOSINOPHIL NFR BLD AUTO: 0.9 % (ref 0.3–6.2)
ERYTHROCYTE [DISTWIDTH] IN BLOOD BY AUTOMATED COUNT: 15 % (ref 12.3–15.4)
GLUCOSE SERPL-MCNC: 120 MG/DL (ref 65–99)
HCO3 BLDA-SCNC: 23.8 MMOL/L (ref 20–26)
HCT VFR BLD AUTO: 35.4 % (ref 34–46.6)
HGB BLD-MCNC: 10.7 G/DL (ref 12–15.9)
IMM GRANULOCYTES # BLD AUTO: 0.03 10*3/MM3 (ref 0–0.05)
IMM GRANULOCYTES NFR BLD AUTO: 0.5 % (ref 0–0.5)
INHALED O2 CONCENTRATION: 100 %
LYMPHOCYTES # BLD AUTO: 0.56 10*3/MM3 (ref 0.7–3.1)
LYMPHOCYTES NFR BLD AUTO: 10.1 % (ref 19.6–45.3)
Lab: ABNORMAL
MCH RBC QN AUTO: 23.7 PG (ref 26.6–33)
MCHC RBC AUTO-ENTMCNC: 30.2 G/DL (ref 31.5–35.7)
MCV RBC AUTO: 78.3 FL (ref 79–97)
MODALITY: ABNORMAL
MONOCYTES # BLD AUTO: 0.47 10*3/MM3 (ref 0.1–0.9)
MONOCYTES NFR BLD AUTO: 8.5 % (ref 5–12)
NEUTROPHILS NFR BLD AUTO: 4.39 10*3/MM3 (ref 1.7–7)
NEUTROPHILS NFR BLD AUTO: 79.3 % (ref 42.7–76)
NRBC BLD AUTO-RTO: 0 /100 WBC (ref 0–0.2)
PCO2 BLDA: 40.9 MM HG (ref 35–45)
PCO2 TEMP ADJ BLD: 40.9 MM HG (ref 35–45)
PEEP RESPIRATORY: 5 CM[H2O]
PH BLDA: 7.37 PH UNITS (ref 7.35–7.45)
PH, TEMP CORRECTED: 7.37 PH UNITS (ref 7.35–7.45)
PLATELET # BLD AUTO: 179 10*3/MM3 (ref 140–450)
PMV BLD AUTO: 10.4 FL (ref 6–12)
PO2 BLDA: 447 MM HG (ref 83–108)
PO2 TEMP ADJ BLD: 447 MM HG (ref 83–108)
POTASSIUM SERPL-SCNC: 4.3 MMOL/L (ref 3.5–5.2)
PROT SERPL-MCNC: 6.7 G/DL (ref 6–8.5)
QT INTERVAL: 414 MS
QTC INTERVAL: 489 MS
RBC # BLD AUTO: 4.52 10*6/MM3 (ref 3.77–5.28)
SAO2 % BLDCOA: >100.1 % (ref 94–99)
SET MECH RESP RATE: 14
SODIUM SERPL-SCNC: 140 MMOL/L (ref 136–145)
VENTILATOR MODE: AC
VT ON VENT VENT: 450 ML
WBC NRBC COR # BLD: 5.54 10*3/MM3 (ref 3.4–10.8)

## 2023-05-18 PROCEDURE — 94799 UNLISTED PULMONARY SVC/PX: CPT

## 2023-05-18 PROCEDURE — 74018 RADEX ABDOMEN 1 VIEW: CPT

## 2023-05-18 PROCEDURE — 71045 X-RAY EXAM CHEST 1 VIEW: CPT

## 2023-05-18 PROCEDURE — 0BH17EZ INSERTION OF ENDOTRACHEAL AIRWAY INTO TRACHEA, VIA NATURAL OR ARTIFICIAL OPENING: ICD-10-PCS | Performed by: FAMILY MEDICINE

## 2023-05-18 PROCEDURE — 80048 BASIC METABOLIC PNL TOTAL CA: CPT | Performed by: INTERNAL MEDICINE

## 2023-05-18 PROCEDURE — 80076 HEPATIC FUNCTION PANEL: CPT | Performed by: INTERNAL MEDICINE

## 2023-05-18 PROCEDURE — 93010 ELECTROCARDIOGRAM REPORT: CPT | Performed by: INTERNAL MEDICINE

## 2023-05-18 PROCEDURE — 94002 VENT MGMT INPAT INIT DAY: CPT

## 2023-05-18 PROCEDURE — 82803 BLOOD GASES ANY COMBINATION: CPT

## 2023-05-18 PROCEDURE — 93005 ELECTROCARDIOGRAM TRACING: CPT | Performed by: INTERNAL MEDICINE

## 2023-05-18 PROCEDURE — 36600 WITHDRAWAL OF ARTERIAL BLOOD: CPT

## 2023-05-18 PROCEDURE — 5A1935Z RESPIRATORY VENTILATION, LESS THAN 24 CONSECUTIVE HOURS: ICD-10-PCS | Performed by: FAMILY MEDICINE

## 2023-05-18 PROCEDURE — 85025 COMPLETE CBC W/AUTO DIFF WBC: CPT | Performed by: INTERNAL MEDICINE

## 2023-05-18 PROCEDURE — 99222 1ST HOSP IP/OBS MODERATE 55: CPT | Performed by: INTERNAL MEDICINE

## 2023-05-18 RX ORDER — ALBUTEROL SULFATE 2.5 MG/3ML
2.5 SOLUTION RESPIRATORY (INHALATION) EVERY 4 HOURS PRN
Status: DISCONTINUED | OUTPATIENT
Start: 2023-05-18 | End: 2023-05-21 | Stop reason: HOSPADM

## 2023-05-18 RX ORDER — PANTOPRAZOLE SODIUM 40 MG/10ML
40 INJECTION, POWDER, LYOPHILIZED, FOR SOLUTION INTRAVENOUS
Status: DISCONTINUED | OUTPATIENT
Start: 2023-05-18 | End: 2023-05-19

## 2023-05-18 RX ORDER — BUTALBITAL, ACETAMINOPHEN AND CAFFEINE 50; 325; 40 MG/1; MG/1; MG/1
1 TABLET ORAL EVERY 4 HOURS PRN
Status: DISCONTINUED | OUTPATIENT
Start: 2023-05-18 | End: 2023-05-21 | Stop reason: HOSPADM

## 2023-05-18 RX ORDER — ACETAMINOPHEN 325 MG/1
650 TABLET ORAL EVERY 6 HOURS PRN
Status: DISCONTINUED | OUTPATIENT
Start: 2023-05-18 | End: 2023-05-21 | Stop reason: HOSPADM

## 2023-05-18 RX ORDER — CHLORHEXIDINE GLUCONATE 500 MG/1
1 CLOTH TOPICAL EVERY 24 HOURS
Status: DISCONTINUED | OUTPATIENT
Start: 2023-05-19 | End: 2023-05-20 | Stop reason: HOSPADM

## 2023-05-18 RX ORDER — CHLORHEXIDINE GLUCONATE 500 MG/1
1 CLOTH TOPICAL ONCE
Status: COMPLETED | OUTPATIENT
Start: 2023-05-18 | End: 2023-05-18

## 2023-05-18 RX ORDER — CHLORHEXIDINE GLUCONATE 0.12 MG/ML
15 RINSE ORAL EVERY 12 HOURS SCHEDULED
Status: DISCONTINUED | OUTPATIENT
Start: 2023-05-18 | End: 2023-05-21 | Stop reason: HOSPADM

## 2023-05-18 RX ORDER — SODIUM CHLORIDE 9 MG/ML
100 INJECTION, SOLUTION INTRAVENOUS CONTINUOUS
Status: DISCONTINUED | OUTPATIENT
Start: 2023-05-18 | End: 2023-05-21 | Stop reason: HOSPADM

## 2023-05-18 RX ORDER — DEXMEDETOMIDINE HYDROCHLORIDE 4 UG/ML
.2-1.5 INJECTION, SOLUTION INTRAVENOUS
Status: DISCONTINUED | OUTPATIENT
Start: 2023-05-18 | End: 2023-05-20 | Stop reason: HOSPADM

## 2023-05-18 RX ADMIN — CHLORHEXIDINE GLUCONATE 15 ML: 1.2 RINSE ORAL at 21:12

## 2023-05-18 RX ADMIN — DEXMEDETOMIDINE HYDROCHLORIDE 0.2 MCG/KG/HR: 4 INJECTION, SOLUTION INTRAVENOUS at 05:22

## 2023-05-18 RX ADMIN — CHLORHEXIDINE GLUCONATE 1 APPLICATION: 500 CLOTH TOPICAL at 05:25

## 2023-05-18 RX ADMIN — Medication 1 APPLICATION: at 05:27

## 2023-05-18 RX ADMIN — SODIUM CHLORIDE 100 ML/HR: 9 INJECTION, SOLUTION INTRAVENOUS at 15:53

## 2023-05-18 RX ADMIN — SODIUM CHLORIDE 100 ML/HR: 9 INJECTION, SOLUTION INTRAVENOUS at 05:10

## 2023-05-18 RX ADMIN — PANTOPRAZOLE SODIUM 40 MG: 40 INJECTION, POWDER, FOR SOLUTION INTRAVENOUS at 09:04

## 2023-05-18 RX ADMIN — Medication 1 APPLICATION: at 21:12

## 2023-05-18 RX ADMIN — CHLORHEXIDINE GLUCONATE 15 ML: 1.2 RINSE ORAL at 09:04

## 2023-05-19 ENCOUNTER — APPOINTMENT (OUTPATIENT)
Dept: GENERAL RADIOLOGY | Facility: HOSPITAL | Age: 68
End: 2023-05-19
Payer: MEDICARE

## 2023-05-19 ENCOUNTER — ANESTHESIA EVENT (OUTPATIENT)
Dept: GASTROENTEROLOGY | Facility: HOSPITAL | Age: 68
End: 2023-05-19
Payer: MEDICARE

## 2023-05-19 ENCOUNTER — ANESTHESIA (OUTPATIENT)
Dept: GASTROENTEROLOGY | Facility: HOSPITAL | Age: 68
End: 2023-05-19
Payer: MEDICARE

## 2023-05-19 LAB
ALBUMIN SERPL-MCNC: 3.1 G/DL (ref 3.5–5.2)
ALBUMIN/GLOB SERPL: 1.1 G/DL
ALP SERPL-CCNC: 72 U/L (ref 39–117)
ALT SERPL W P-5'-P-CCNC: 15 U/L (ref 1–33)
ANION GAP SERPL CALCULATED.3IONS-SCNC: 10 MMOL/L (ref 5–15)
AST SERPL-CCNC: 20 U/L (ref 1–32)
BASOPHILS # BLD AUTO: 0.06 10*3/MM3 (ref 0–0.2)
BASOPHILS NFR BLD AUTO: 0.9 % (ref 0–1.5)
BILIRUB SERPL-MCNC: 0.9 MG/DL (ref 0–1.2)
BUN SERPL-MCNC: 22 MG/DL (ref 8–23)
BUN/CREAT SERPL: 23.2 (ref 7–25)
CALCIUM SPEC-SCNC: 8.6 MG/DL (ref 8.6–10.5)
CHLORIDE SERPL-SCNC: 111 MMOL/L (ref 98–107)
CO2 SERPL-SCNC: 20 MMOL/L (ref 22–29)
CREAT SERPL-MCNC: 0.95 MG/DL (ref 0.57–1)
DEPRECATED RDW RBC AUTO: 44.3 FL (ref 37–54)
EGFRCR SERPLBLD CKD-EPI 2021: 65.4 ML/MIN/1.73
EOSINOPHIL # BLD AUTO: 0.19 10*3/MM3 (ref 0–0.4)
EOSINOPHIL NFR BLD AUTO: 2.8 % (ref 0.3–6.2)
ERYTHROCYTE [DISTWIDTH] IN BLOOD BY AUTOMATED COUNT: 15.3 % (ref 12.3–15.4)
GLOBULIN UR ELPH-MCNC: 2.7 GM/DL
GLUCOSE SERPL-MCNC: 89 MG/DL (ref 65–99)
HCT VFR BLD AUTO: 35.3 % (ref 34–46.6)
HGB BLD-MCNC: 10.4 G/DL (ref 12–15.9)
IMM GRANULOCYTES # BLD AUTO: 0.02 10*3/MM3 (ref 0–0.05)
IMM GRANULOCYTES NFR BLD AUTO: 0.3 % (ref 0–0.5)
LYMPHOCYTES # BLD AUTO: 0.86 10*3/MM3 (ref 0.7–3.1)
LYMPHOCYTES NFR BLD AUTO: 12.8 % (ref 19.6–45.3)
MCH RBC QN AUTO: 23.8 PG (ref 26.6–33)
MCHC RBC AUTO-ENTMCNC: 29.5 G/DL (ref 31.5–35.7)
MCV RBC AUTO: 80.8 FL (ref 79–97)
MONOCYTES # BLD AUTO: 0.67 10*3/MM3 (ref 0.1–0.9)
MONOCYTES NFR BLD AUTO: 9.9 % (ref 5–12)
NEUTROPHILS NFR BLD AUTO: 4.94 10*3/MM3 (ref 1.7–7)
NEUTROPHILS NFR BLD AUTO: 73.3 % (ref 42.7–76)
NRBC BLD AUTO-RTO: 0 /100 WBC (ref 0–0.2)
PLATELET # BLD AUTO: 148 10*3/MM3 (ref 140–450)
PMV BLD AUTO: 10.6 FL (ref 6–12)
POTASSIUM SERPL-SCNC: 3.9 MMOL/L (ref 3.5–5.2)
PROT SERPL-MCNC: 5.8 G/DL (ref 6–8.5)
QT INTERVAL: 430 MS
QTC INTERVAL: 457 MS
RBC # BLD AUTO: 4.37 10*6/MM3 (ref 3.77–5.28)
SODIUM SERPL-SCNC: 141 MMOL/L (ref 136–145)
WBC NRBC COR # BLD: 6.74 10*3/MM3 (ref 3.4–10.8)

## 2023-05-19 PROCEDURE — 99231 SBSQ HOSP IP/OBS SF/LOW 25: CPT | Performed by: INTERNAL MEDICINE

## 2023-05-19 PROCEDURE — 25010000002 MIDAZOLAM HCL (PF) 5 MG/5ML SOLUTION: Performed by: NURSE ANESTHETIST, CERTIFIED REGISTERED

## 2023-05-19 PROCEDURE — 85025 COMPLETE CBC W/AUTO DIFF WBC: CPT | Performed by: INTERNAL MEDICINE

## 2023-05-19 PROCEDURE — 93005 ELECTROCARDIOGRAM TRACING: CPT | Performed by: INTERNAL MEDICINE

## 2023-05-19 PROCEDURE — 0DJ08ZZ INSPECTION OF UPPER INTESTINAL TRACT, VIA NATURAL OR ARTIFICIAL OPENING ENDOSCOPIC: ICD-10-PCS | Performed by: INTERNAL MEDICINE

## 2023-05-19 PROCEDURE — 43235 EGD DIAGNOSTIC BRUSH WASH: CPT | Performed by: INTERNAL MEDICINE

## 2023-05-19 PROCEDURE — 80053 COMPREHEN METABOLIC PANEL: CPT | Performed by: FAMILY MEDICINE

## 2023-05-19 PROCEDURE — 25010000002 PROPOFOL 10 MG/ML EMULSION: Performed by: NURSE ANESTHETIST, CERTIFIED REGISTERED

## 2023-05-19 PROCEDURE — 71045 X-RAY EXAM CHEST 1 VIEW: CPT

## 2023-05-19 PROCEDURE — 93010 ELECTROCARDIOGRAM REPORT: CPT | Performed by: INTERNAL MEDICINE

## 2023-05-19 RX ORDER — LIDOCAINE HYDROCHLORIDE 20 MG/ML
INJECTION, SOLUTION EPIDURAL; INFILTRATION; INTRACAUDAL; PERINEURAL AS NEEDED
Status: DISCONTINUED | OUTPATIENT
Start: 2023-05-19 | End: 2023-05-19 | Stop reason: SURG

## 2023-05-19 RX ORDER — PROPOFOL 10 MG/ML
VIAL (ML) INTRAVENOUS AS NEEDED
Status: DISCONTINUED | OUTPATIENT
Start: 2023-05-19 | End: 2023-05-19 | Stop reason: SURG

## 2023-05-19 RX ORDER — MIDAZOLAM HYDROCHLORIDE 1 MG/ML
INJECTION, SOLUTION INTRAMUSCULAR; INTRAVENOUS AS NEEDED
Status: DISCONTINUED | OUTPATIENT
Start: 2023-05-19 | End: 2023-05-19 | Stop reason: SURG

## 2023-05-19 RX ORDER — PANTOPRAZOLE SODIUM 40 MG/1
40 TABLET, DELAYED RELEASE ORAL
Status: DISCONTINUED | OUTPATIENT
Start: 2023-05-19 | End: 2023-05-21 | Stop reason: HOSPADM

## 2023-05-19 RX ADMIN — Medication 1 APPLICATION: at 20:17

## 2023-05-19 RX ADMIN — BUTALBITAL, ACETAMINOPHEN, AND CAFFEINE 1 TABLET: 50; 325; 40 TABLET ORAL at 00:11

## 2023-05-19 RX ADMIN — CHLORHEXIDINE GLUCONATE 15 ML: 1.2 RINSE ORAL at 20:17

## 2023-05-19 RX ADMIN — BUTALBITAL, ACETAMINOPHEN, AND CAFFEINE 1 TABLET: 50; 325; 40 TABLET ORAL at 07:50

## 2023-05-19 RX ADMIN — MIDAZOLAM HYDROCHLORIDE 1 MG: 1 INJECTION, SOLUTION INTRAMUSCULAR; INTRAVENOUS at 14:39

## 2023-05-19 RX ADMIN — PANTOPRAZOLE SODIUM 40 MG: 40 TABLET, DELAYED RELEASE ORAL at 08:40

## 2023-05-19 RX ADMIN — MIDAZOLAM HYDROCHLORIDE 1 MG: 1 INJECTION, SOLUTION INTRAMUSCULAR; INTRAVENOUS at 14:32

## 2023-05-19 RX ADMIN — PROPOFOL INJECTABLE EMULSION 50 MG: 10 INJECTION, EMULSION INTRAVENOUS at 14:42

## 2023-05-19 RX ADMIN — MIDAZOLAM HYDROCHLORIDE 1 MG: 1 INJECTION, SOLUTION INTRAMUSCULAR; INTRAVENOUS at 14:34

## 2023-05-19 RX ADMIN — MIDAZOLAM HYDROCHLORIDE 2 MG: 1 INJECTION, SOLUTION INTRAMUSCULAR; INTRAVENOUS at 14:38

## 2023-05-19 RX ADMIN — PROPOFOL INJECTABLE EMULSION 100 MG: 10 INJECTION, EMULSION INTRAVENOUS at 14:41

## 2023-05-19 RX ADMIN — PROPOFOL INJECTABLE EMULSION 100 MG: 10 INJECTION, EMULSION INTRAVENOUS at 14:40

## 2023-05-19 RX ADMIN — ACETAMINOPHEN 650 MG: 325 TABLET, FILM COATED ORAL at 19:32

## 2023-05-19 RX ADMIN — LIDOCAINE HYDROCHLORIDE 100 MG: 20 INJECTION, SOLUTION EPIDURAL; INFILTRATION; INTRACAUDAL; PERINEURAL at 14:39

## 2023-05-19 RX ADMIN — SODIUM CHLORIDE 100 ML/HR: 9 INJECTION, SOLUTION INTRAVENOUS at 02:24

## 2023-05-19 RX ADMIN — Medication 1 APPLICATION: at 08:40

## 2023-05-20 ENCOUNTER — APPOINTMENT (OUTPATIENT)
Dept: GENERAL RADIOLOGY | Facility: HOSPITAL | Age: 68
End: 2023-05-20
Payer: MEDICARE

## 2023-05-20 PROBLEM — M25.512 ACUTE PAIN OF LEFT SHOULDER: Status: ACTIVE | Noted: 2023-05-20

## 2023-05-20 LAB
BASOPHILS # BLD AUTO: 0.04 10*3/MM3 (ref 0–0.2)
BASOPHILS NFR BLD AUTO: 0.9 % (ref 0–1.5)
DEPRECATED RDW RBC AUTO: 45.2 FL (ref 37–54)
EOSINOPHIL # BLD AUTO: 0.26 10*3/MM3 (ref 0–0.4)
EOSINOPHIL NFR BLD AUTO: 5.7 % (ref 0.3–6.2)
ERYTHROCYTE [DISTWIDTH] IN BLOOD BY AUTOMATED COUNT: 15.5 % (ref 12.3–15.4)
HCT VFR BLD AUTO: 33.5 % (ref 34–46.6)
HGB BLD-MCNC: 9.9 G/DL (ref 12–15.9)
IMM GRANULOCYTES # BLD AUTO: 0.02 10*3/MM3 (ref 0–0.05)
IMM GRANULOCYTES NFR BLD AUTO: 0.4 % (ref 0–0.5)
LYMPHOCYTES # BLD AUTO: 0.88 10*3/MM3 (ref 0.7–3.1)
LYMPHOCYTES NFR BLD AUTO: 19.2 % (ref 19.6–45.3)
MCH RBC QN AUTO: 24 PG (ref 26.6–33)
MCHC RBC AUTO-ENTMCNC: 29.6 G/DL (ref 31.5–35.7)
MCV RBC AUTO: 81.3 FL (ref 79–97)
MONOCYTES # BLD AUTO: 0.5 10*3/MM3 (ref 0.1–0.9)
MONOCYTES NFR BLD AUTO: 10.9 % (ref 5–12)
NEUTROPHILS NFR BLD AUTO: 2.88 10*3/MM3 (ref 1.7–7)
NEUTROPHILS NFR BLD AUTO: 62.9 % (ref 42.7–76)
NRBC BLD AUTO-RTO: 0 /100 WBC (ref 0–0.2)
PLATELET # BLD AUTO: 172 10*3/MM3 (ref 140–450)
PMV BLD AUTO: 10.8 FL (ref 6–12)
RBC # BLD AUTO: 4.12 10*6/MM3 (ref 3.77–5.28)
WBC NRBC COR # BLD: 4.58 10*3/MM3 (ref 3.4–10.8)

## 2023-05-20 PROCEDURE — 73030 X-RAY EXAM OF SHOULDER: CPT

## 2023-05-20 PROCEDURE — 85025 COMPLETE CBC W/AUTO DIFF WBC: CPT | Performed by: INTERNAL MEDICINE

## 2023-05-20 RX ORDER — LISINOPRIL 10 MG/1
10 TABLET ORAL
Status: DISCONTINUED | OUTPATIENT
Start: 2023-05-20 | End: 2023-05-21 | Stop reason: HOSPADM

## 2023-05-20 RX ORDER — LANOLIN ALCOHOL/MO/W.PET/CERES
3 CREAM (GRAM) TOPICAL NIGHTLY PRN
Status: DISCONTINUED | OUTPATIENT
Start: 2023-05-20 | End: 2023-05-21 | Stop reason: HOSPADM

## 2023-05-20 RX ADMIN — ACETAMINOPHEN 650 MG: 325 TABLET, FILM COATED ORAL at 02:30

## 2023-05-20 RX ADMIN — BUTALBITAL, ACETAMINOPHEN, AND CAFFEINE 1 TABLET: 50; 325; 40 TABLET ORAL at 10:42

## 2023-05-20 RX ADMIN — PANTOPRAZOLE SODIUM 40 MG: 40 TABLET, DELAYED RELEASE ORAL at 05:25

## 2023-05-20 RX ADMIN — Medication 1 APPLICATION: at 20:52

## 2023-05-20 RX ADMIN — BUTALBITAL, ACETAMINOPHEN, AND CAFFEINE 1 TABLET: 50; 325; 40 TABLET ORAL at 00:23

## 2023-05-20 RX ADMIN — BUTALBITAL, ACETAMINOPHEN, AND CAFFEINE 1 TABLET: 50; 325; 40 TABLET ORAL at 20:52

## 2023-05-20 RX ADMIN — ACETAMINOPHEN 650 MG: 325 TABLET, FILM COATED ORAL at 09:26

## 2023-05-20 RX ADMIN — BUTALBITAL, ACETAMINOPHEN, AND CAFFEINE 1 TABLET: 50; 325; 40 TABLET ORAL at 14:30

## 2023-05-20 RX ADMIN — ACETAMINOPHEN 650 MG: 325 TABLET, FILM COATED ORAL at 18:26

## 2023-05-20 RX ADMIN — LISINOPRIL 10 MG: 10 TABLET ORAL at 10:42

## 2023-05-20 RX ADMIN — Medication 3 MG: at 20:37

## 2023-05-20 RX ADMIN — BUTALBITAL, ACETAMINOPHEN, AND CAFFEINE 1 TABLET: 50; 325; 40 TABLET ORAL at 05:44

## 2023-05-20 RX ADMIN — CHLORHEXIDINE GLUCONATE 1 APPLICATION: 500 CLOTH TOPICAL at 04:35

## 2023-05-20 RX ADMIN — CHLORHEXIDINE GLUCONATE 15 ML: 1.2 RINSE ORAL at 20:38

## 2023-05-21 VITALS
SYSTOLIC BLOOD PRESSURE: 168 MMHG | BODY MASS INDEX: 28.54 KG/M2 | RESPIRATION RATE: 16 BRPM | HEART RATE: 60 BPM | TEMPERATURE: 97.9 F | OXYGEN SATURATION: 100 % | HEIGHT: 64 IN | DIASTOLIC BLOOD PRESSURE: 91 MMHG | WEIGHT: 167.2 LBS

## 2023-05-21 LAB
BASOPHILS # BLD AUTO: 0.06 10*3/MM3 (ref 0–0.2)
BASOPHILS NFR BLD AUTO: 1.2 % (ref 0–1.5)
DEPRECATED RDW RBC AUTO: 42.9 FL (ref 37–54)
EOSINOPHIL # BLD AUTO: 0.22 10*3/MM3 (ref 0–0.4)
EOSINOPHIL NFR BLD AUTO: 4.4 % (ref 0.3–6.2)
ERYTHROCYTE [DISTWIDTH] IN BLOOD BY AUTOMATED COUNT: 15.2 % (ref 12.3–15.4)
HCT VFR BLD AUTO: 34.4 % (ref 34–46.6)
HGB BLD-MCNC: 10.5 G/DL (ref 12–15.9)
IMM GRANULOCYTES # BLD AUTO: 0.02 10*3/MM3 (ref 0–0.05)
IMM GRANULOCYTES NFR BLD AUTO: 0.4 % (ref 0–0.5)
LYMPHOCYTES # BLD AUTO: 0.97 10*3/MM3 (ref 0.7–3.1)
LYMPHOCYTES NFR BLD AUTO: 19.4 % (ref 19.6–45.3)
MCH RBC QN AUTO: 24.1 PG (ref 26.6–33)
MCHC RBC AUTO-ENTMCNC: 30.5 G/DL (ref 31.5–35.7)
MCV RBC AUTO: 79.1 FL (ref 79–97)
MONOCYTES # BLD AUTO: 0.53 10*3/MM3 (ref 0.1–0.9)
MONOCYTES NFR BLD AUTO: 10.6 % (ref 5–12)
NEUTROPHILS NFR BLD AUTO: 3.19 10*3/MM3 (ref 1.7–7)
NEUTROPHILS NFR BLD AUTO: 64 % (ref 42.7–76)
NRBC BLD AUTO-RTO: 0 /100 WBC (ref 0–0.2)
PLATELET # BLD AUTO: 183 10*3/MM3 (ref 140–450)
PMV BLD AUTO: 10.5 FL (ref 6–12)
RBC # BLD AUTO: 4.35 10*6/MM3 (ref 3.77–5.28)
WBC NRBC COR # BLD: 4.99 10*3/MM3 (ref 3.4–10.8)

## 2023-05-21 PROCEDURE — 85025 COMPLETE CBC W/AUTO DIFF WBC: CPT | Performed by: INTERNAL MEDICINE

## 2023-05-21 RX ORDER — LISINOPRIL 10 MG/1
10 TABLET ORAL
Qty: 30 TABLET | Refills: 0 | Status: SHIPPED | OUTPATIENT
Start: 2023-05-21

## 2023-05-21 RX ORDER — PANTOPRAZOLE SODIUM 40 MG/1
40 TABLET, DELAYED RELEASE ORAL
Qty: 30 TABLET | Refills: 0 | Status: SHIPPED | OUTPATIENT
Start: 2023-05-22

## 2023-05-21 RX ORDER — CLONIDINE HYDROCHLORIDE 0.1 MG/1
0.1 TABLET ORAL ONCE
Status: COMPLETED | OUTPATIENT
Start: 2023-05-21 | End: 2023-05-21

## 2023-05-21 RX ADMIN — CLONIDINE HYDROCHLORIDE 0.1 MG: 0.1 TABLET ORAL at 05:56

## 2023-05-21 RX ADMIN — SODIUM CHLORIDE 100 ML/HR: 9 INJECTION, SOLUTION INTRAVENOUS at 04:11

## 2023-05-21 RX ADMIN — PANTOPRAZOLE SODIUM 40 MG: 40 TABLET, DELAYED RELEASE ORAL at 05:03

## 2023-05-21 RX ADMIN — LISINOPRIL 10 MG: 10 TABLET ORAL at 09:25

## 2023-05-21 RX ADMIN — ACETAMINOPHEN 650 MG: 325 TABLET, FILM COATED ORAL at 04:16

## 2023-05-21 RX ADMIN — BUTALBITAL, ACETAMINOPHEN, AND CAFFEINE 1 TABLET: 50; 325; 40 TABLET ORAL at 01:00

## 2023-08-08 ENCOUNTER — DOCUMENTATION (OUTPATIENT)
Dept: CT IMAGING | Facility: HOSPITAL | Age: 68
End: 2023-08-08
Payer: MEDICARE

## 2023-08-15 ENCOUNTER — TELEPHONE (OUTPATIENT)
Dept: CT IMAGING | Facility: HOSPITAL | Age: 68
End: 2023-08-15
Payer: MEDICARE

## (undated) DEVICE — CONMED SCOPE SAVER BITE BLOCK, 20X27 MM: Brand: SCOPE SAVER

## (undated) DEVICE — CUFF,BP,DISP,1 TUBE,ADULT,HP: Brand: MEDLINE

## (undated) DEVICE — YANKAUER,BULB TIP WITH VENT: Brand: ARGYLE

## (undated) DEVICE — SENSR O2 OXIMAX FNGR A/ 18IN NONSTR

## (undated) DEVICE — THE CHANNEL CLEANING BRUSH IS A NYLON FLEXI BRUSH ATTACHED TO A FLEXIBLE PLASTIC SHEATH DESIGNED TO SAFELY REMOVE DEBRIS FROM FLEXIBLE ENDOSCOPES.

## (undated) DEVICE — Device: Brand: DEFENDO AIR/WATER/SUCTION AND BIOPSY VALVE